# Patient Record
Sex: FEMALE
[De-identification: names, ages, dates, MRNs, and addresses within clinical notes are randomized per-mention and may not be internally consistent; named-entity substitution may affect disease eponyms.]

---

## 2018-07-27 ENCOUNTER — APPOINTMENT (OUTPATIENT)
Dept: FAMILY MEDICINE | Facility: CLINIC | Age: 58
End: 2018-07-27
Payer: COMMERCIAL

## 2018-07-27 VITALS — SYSTOLIC BLOOD PRESSURE: 132 MMHG | DIASTOLIC BLOOD PRESSURE: 92 MMHG

## 2018-07-27 VITALS
HEIGHT: 64 IN | WEIGHT: 133.5 LBS | SYSTOLIC BLOOD PRESSURE: 140 MMHG | DIASTOLIC BLOOD PRESSURE: 80 MMHG | BODY MASS INDEX: 22.79 KG/M2

## 2018-07-27 DIAGNOSIS — F41.9 ANXIETY DISORDER, UNSPECIFIED: ICD-10-CM

## 2018-07-27 DIAGNOSIS — L70.9 ACNE, UNSPECIFIED: ICD-10-CM

## 2018-07-27 DIAGNOSIS — Z80.42 FAMILY HISTORY OF MALIGNANT NEOPLASM OF PROSTATE: ICD-10-CM

## 2018-07-27 DIAGNOSIS — R20.2 PARESTHESIA OF SKIN: ICD-10-CM

## 2018-07-27 DIAGNOSIS — Z90.81 ACQUIRED ABSENCE OF SPLEEN: ICD-10-CM

## 2018-07-27 DIAGNOSIS — Z78.9 OTHER SPECIFIED HEALTH STATUS: ICD-10-CM

## 2018-07-27 DIAGNOSIS — Z13.220 ENCOUNTER FOR SCREENING FOR LIPOID DISORDERS: ICD-10-CM

## 2018-07-27 DIAGNOSIS — Z82.49 FAMILY HISTORY OF ISCHEMIC HEART DISEASE AND OTHER DISEASES OF THE CIRCULATORY SYSTEM: ICD-10-CM

## 2018-07-27 PROCEDURE — 99386 PREV VISIT NEW AGE 40-64: CPT

## 2018-07-27 NOTE — HEALTH RISK ASSESSMENT
[Patient reported mammogram was normal] : Patient reported mammogram was normal [Patient reported PAP Smear was normal] : Patient reported PAP Smear was normal [MammogramDate] : 07/16 [PapSmearDate] : 07/16 [ColonoscopyComments] : declines

## 2018-07-27 NOTE — HISTORY OF PRESENT ILLNESS
[de-identified] : New patient to establish care. Pt dx with pancreatic CA s/p tumor excision and splenectomy 4/23/18 at [facility] Dr. Rodriguez surgeon. Pt seeing yolanda Connor for chemotherapy. Pt currently on chemotherapy 2nd week of 2nd cycle out of 6 cycles of chemo.\par Pt has been having anxiety and elevated BPs 140-160/100. Anxiety has improved lately but BP still has been elevated. Denies HA, CP., palpitations. Pt also c/o facial acne, L foot paresthesias.\par Nonsmoker\par ETOH use rare\par Drug use denies\par Exercises regularly walking, used to run marathons 6 months ago\par Diet balanced\par Retired, used to be [employer] \par , has 2 children\par Prior to surgery was avid runner. Pt exercises walking every other day.\par Pt usually lives in [place] but getting tx for cancer in [place]

## 2018-07-27 NOTE — ASSESSMENT
[FreeTextEntry1] : HTN- start lisnopril 10mg. Possible adverse effects discussed with pt\par pancreatic cancer - f/u with heme onc\par L leg paresthesias - check b12 levels\par acne - start benzoyl erythromycin gel\par anxiety - cont calming exercises. if worsens rto\par Healthy diet and regular exercise regimen discussed w/ pt.\par Screening labs ordered\par flu vaccine in the fall recommended\par Advised routine pap smear and mammogram\par Any questions call office\par

## 2018-07-31 LAB
CHOLEST SERPL-MCNC: 257 MG/DL
CHOLEST/HDLC SERPL: 2.5 RATIO
HDLC SERPL-MCNC: 102 MG/DL
LDLC SERPL CALC-MCNC: 142 MG/DL
TRIGL SERPL-MCNC: 64 MG/DL
VIT B12 SERPL-MCNC: 1271 PG/ML

## 2018-08-23 ENCOUNTER — APPOINTMENT (OUTPATIENT)
Dept: FAMILY MEDICINE | Facility: CLINIC | Age: 58
End: 2018-08-23
Payer: COMMERCIAL

## 2018-08-23 VITALS
BODY MASS INDEX: 23.47 KG/M2 | SYSTOLIC BLOOD PRESSURE: 140 MMHG | WEIGHT: 137.5 LBS | DIASTOLIC BLOOD PRESSURE: 80 MMHG | HEIGHT: 64 IN

## 2018-08-23 VITALS — SYSTOLIC BLOOD PRESSURE: 130 MMHG | DIASTOLIC BLOOD PRESSURE: 90 MMHG

## 2018-08-23 PROCEDURE — 99213 OFFICE O/P EST LOW 20 MIN: CPT

## 2018-08-23 RX ORDER — CAPECITABINE 150 MG/1
150 TABLET, FILM COATED ORAL DAILY
Qty: 36 | Refills: 0 | Status: DISCONTINUED | COMMUNITY
Start: 2018-07-27 | End: 2018-08-23

## 2018-08-23 RX ORDER — CAPECITABINE 500 MG/1
500 TABLET ORAL TWICE DAILY
Refills: 0 | Status: ACTIVE | COMMUNITY

## 2018-08-23 NOTE — HISTORY OF PRESENT ILLNESS
[de-identified] : Pt for f/u htn. Pt tolerating lisinopril well. BP at oncologist have been 140s systolic. Denies CP, palpitations, dyspnea, n/v

## 2018-09-21 ENCOUNTER — TRANSCRIPTION ENCOUNTER (OUTPATIENT)
Age: 58
End: 2018-09-21

## 2018-09-24 ENCOUNTER — APPOINTMENT (OUTPATIENT)
Dept: FAMILY MEDICINE | Facility: CLINIC | Age: 58
End: 2018-09-24
Payer: COMMERCIAL

## 2018-09-24 VITALS
HEIGHT: 64 IN | DIASTOLIC BLOOD PRESSURE: 82 MMHG | OXYGEN SATURATION: 98 % | HEART RATE: 78 BPM | BODY MASS INDEX: 23.39 KG/M2 | WEIGHT: 137 LBS | RESPIRATION RATE: 16 BRPM | SYSTOLIC BLOOD PRESSURE: 142 MMHG

## 2018-09-24 VITALS — SYSTOLIC BLOOD PRESSURE: 136 MMHG | DIASTOLIC BLOOD PRESSURE: 84 MMHG

## 2018-09-24 PROCEDURE — 99213 OFFICE O/P EST LOW 20 MIN: CPT

## 2018-09-24 NOTE — HISTORY OF PRESENT ILLNESS
[de-identified] : Pt in office for f/u htn. Pt compliant w/ BP meds at this time and reports no significant AE on meds. BP at her last visit at oncologist office was well controlled. Denies CP, palpitations, HA, dyspnea.

## 2018-09-25 ENCOUNTER — RX RENEWAL (OUTPATIENT)
Age: 58
End: 2018-09-25

## 2018-10-30 ENCOUNTER — APPOINTMENT (OUTPATIENT)
Dept: FAMILY MEDICINE | Facility: CLINIC | Age: 58
End: 2018-10-30
Payer: COMMERCIAL

## 2018-10-30 VITALS
RESPIRATION RATE: 16 BRPM | HEART RATE: 75 BPM | OXYGEN SATURATION: 98 % | SYSTOLIC BLOOD PRESSURE: 118 MMHG | DIASTOLIC BLOOD PRESSURE: 76 MMHG | HEIGHT: 64 IN | TEMPERATURE: 98.5 F | WEIGHT: 137 LBS | BODY MASS INDEX: 23.39 KG/M2

## 2018-10-30 DIAGNOSIS — Z23 ENCOUNTER FOR IMMUNIZATION: ICD-10-CM

## 2018-10-30 PROCEDURE — G0008: CPT

## 2018-10-30 PROCEDURE — 90686 IIV4 VACC NO PRSV 0.5 ML IM: CPT

## 2018-10-30 NOTE — HISTORY OF PRESENT ILLNESS
[de-identified] : Risks and benefits of flu vaccine discussed w/ pt. Consent given by pt, flu vaccine administered. Pt tolerated well.\par Pt also brought in record of vaccinations. She is s/p splenectomy and is uptodate w/ haemophilus B, menveo, prevnar/pneumovax vaccines.

## 2018-12-06 ENCOUNTER — RX RENEWAL (OUTPATIENT)
Age: 58
End: 2018-12-06

## 2019-03-04 ENCOUNTER — RX RENEWAL (OUTPATIENT)
Age: 59
End: 2019-03-04

## 2019-03-13 ENCOUNTER — TRANSCRIPTION ENCOUNTER (OUTPATIENT)
Age: 59
End: 2019-03-13

## 2019-05-20 ENCOUNTER — TRANSCRIPTION ENCOUNTER (OUTPATIENT)
Age: 59
End: 2019-05-20

## 2019-06-02 ENCOUNTER — RX RENEWAL (OUTPATIENT)
Age: 59
End: 2019-06-02

## 2019-06-10 ENCOUNTER — APPOINTMENT (OUTPATIENT)
Dept: FAMILY MEDICINE | Facility: CLINIC | Age: 59
End: 2019-06-10
Payer: COMMERCIAL

## 2019-06-10 VITALS
OXYGEN SATURATION: 99 % | BODY MASS INDEX: 23.39 KG/M2 | WEIGHT: 137 LBS | HEIGHT: 64 IN | HEART RATE: 81 BPM | DIASTOLIC BLOOD PRESSURE: 86 MMHG | SYSTOLIC BLOOD PRESSURE: 120 MMHG

## 2019-06-10 DIAGNOSIS — C25.9 MALIGNANT NEOPLASM OF PANCREAS, UNSPECIFIED: ICD-10-CM

## 2019-06-10 DIAGNOSIS — Z00.00 ENCOUNTER FOR GENERAL ADULT MEDICAL EXAMINATION W/OUT ABNORMAL FINDINGS: ICD-10-CM

## 2019-06-10 DIAGNOSIS — K21.9 GASTRO-ESOPHAGEAL REFLUX DISEASE W/OUT ESOPHAGITIS: ICD-10-CM

## 2019-06-10 DIAGNOSIS — I10 ESSENTIAL (PRIMARY) HYPERTENSION: ICD-10-CM

## 2019-06-10 DIAGNOSIS — K64.4 RESIDUAL HEMORRHOIDAL SKIN TAGS: ICD-10-CM

## 2019-06-10 PROCEDURE — 99214 OFFICE O/P EST MOD 30 MIN: CPT | Mod: 25

## 2019-06-10 PROCEDURE — G0444 DEPRESSION SCREEN ANNUAL: CPT

## 2019-06-10 RX ORDER — HYDROCORTISONE 25 MG/G
2.5 CREAM TOPICAL TWICE DAILY
Qty: 1 | Refills: 2 | Status: ACTIVE | COMMUNITY
Start: 2019-06-10 | End: 1900-01-01

## 2019-06-10 NOTE — HISTORY OF PRESENT ILLNESS
[de-identified] : Pt c/o GERD intermittently for past 1-2 months. Pt does eat a lot of citric fruits and acidic vinegary foods. Pt has not tried meds.\par Pt has hemorrhoids that are symptomatic at this time. \par Pt also f/u htn. BP has been controlled\par Pt has hx of pancreatic CA. Pt has completed chemo will be getting chemoport taken out in the coming weeks.

## 2019-06-10 NOTE — HEALTH RISK ASSESSMENT
[Patient reported mammogram was normal] : Patient reported mammogram was normal [MammogramDate] : 11/17

## 2019-06-10 NOTE — REVIEW OF SYSTEMS
[Abdominal Pain] : no abdominal pain [Heartburn] : heartburn [Negative] : Psychiatric [FreeTextEntry7] : see hpi

## 2019-06-10 NOTE — ASSESSMENT
[FreeTextEntry1] : GERD- avoid exacerbatory foods. omeprazole prn\par HTN- refill lisinopril 20mg q day.\par hemorrhoids - start anusol bid prn\par mammo ordered\par referral to GI/colorectal for screening colonoscopy

## 2019-09-16 RX ORDER — ERYTHROMYCIN AND BENZOYL PEROXIDE 3 %-5 %
5-3 KIT TOPICAL TWICE DAILY
Qty: 1 | Refills: 1 | Status: ACTIVE | COMMUNITY
Start: 2018-07-27 | End: 1900-01-01

## 2020-03-10 ENCOUNTER — RX RENEWAL (OUTPATIENT)
Age: 60
End: 2020-03-10

## 2020-03-30 ENCOUNTER — TRANSCRIPTION ENCOUNTER (OUTPATIENT)
Age: 60
End: 2020-03-30

## 2020-03-30 RX ORDER — LISINOPRIL 20 MG/1
20 TABLET ORAL
Qty: 90 | Refills: 0 | Status: ACTIVE | COMMUNITY
Start: 2018-07-27 | End: 1900-01-01